# Patient Record
Sex: FEMALE | Race: WHITE | NOT HISPANIC OR LATINO | URBAN - METROPOLITAN AREA
[De-identification: names, ages, dates, MRNs, and addresses within clinical notes are randomized per-mention and may not be internally consistent; named-entity substitution may affect disease eponyms.]

---

## 2019-02-19 ENCOUNTER — EMERGENCY (EMERGENCY)
Facility: HOSPITAL | Age: 59
LOS: 0 days | Discharge: HOME | End: 2019-02-19
Attending: EMERGENCY MEDICINE | Admitting: EMERGENCY MEDICINE

## 2019-02-19 VITALS
SYSTOLIC BLOOD PRESSURE: 163 MMHG | RESPIRATION RATE: 18 BRPM | HEART RATE: 98 BPM | TEMPERATURE: 98 F | DIASTOLIC BLOOD PRESSURE: 95 MMHG | OXYGEN SATURATION: 98 %

## 2019-02-19 VITALS
WEIGHT: 134.92 LBS | HEART RATE: 124 BPM | RESPIRATION RATE: 20 BRPM | DIASTOLIC BLOOD PRESSURE: 102 MMHG | SYSTOLIC BLOOD PRESSURE: 187 MMHG | TEMPERATURE: 97 F | OXYGEN SATURATION: 98 %

## 2019-02-19 DIAGNOSIS — L76.21 POSTPROCEDURAL HEMORRHAGE OF SKIN AND SUBCUTANEOUS TISSUE FOLLOWING A DERMATOLOGIC PROCEDURE: ICD-10-CM

## 2019-02-19 DIAGNOSIS — I10 ESSENTIAL (PRIMARY) HYPERTENSION: ICD-10-CM
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
## 2019-02-19 DIAGNOSIS — Z79.899 OTHER LONG TERM (CURRENT) DRUG THERAPY: ICD-10-CM
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
## 2019-02-19 DIAGNOSIS — F41.9 ANXIETY DISORDER, UNSPECIFIED: ICD-10-CM

## 2019-02-19 RX ORDER — ALPRAZOLAM 0.25 MG
0 TABLET ORAL
Qty: 0 | Refills: 0 | COMMUNITY

## 2019-02-19 RX ORDER — LISINOPRIL 2.5 MG/1
0 TABLET ORAL
Qty: 0 | Refills: 0 | COMMUNITY

## 2019-02-19 RX ORDER — HYDROCHLOROTHIAZIDE 25 MG
0 TABLET ORAL
Qty: 0 | Refills: 0 | COMMUNITY

## 2019-02-19 RX ORDER — METOPROLOL TARTRATE 50 MG
0 TABLET ORAL
Qty: 0 | Refills: 0 | COMMUNITY

## 2019-02-19 NOTE — ED ADULT NURSE NOTE - CHPI ED NUR SYMPTOMS NEG
no fever/no vomiting/no pain/no purulent drainage/no rectal pain/no redness/no drainage/no chills/no blood in mucus

## 2019-02-19 NOTE — ED PROVIDER NOTE - NS ED ROS FT
Review of Systems    Constitutional: (-) weakness (-) diaphoresis   Eyes: (-) change in vision (-) eye pain  Cardiovascular: (-) chest pain  (-) palpitations  Respiratory: (-) SOB (-) cough   GI: (-) abdominal pain (-) N/V   Integumentary: (-) rash (-) redness   Neurological:  (-) focal deficit (-) altered mental status

## 2019-02-19 NOTE — ED PROVIDER NOTE - PROGRESS NOTE DETAILS
pt resting comfortably no longer bleeding, wound well appearing. Pt is being visited by wound nurse in the AM.

## 2019-02-19 NOTE — ED PROVIDER NOTE - ATTENDING CONTRIBUTION TO CARE
59yo woman h/o HTN, anxiety had face lift 1 day PTA and presented after she noted bleeding though bandage after she vomited today. She had a similar experience after prior face lift and wanted to get checked out, though she does have a wound care nurse coming to the house in the morning. VS, exam as noted, initially hypertensive but also extremely anxious. Wound clean, no active bleeding, specialized dressing still in place. Will observe for bleeding recurrence, likely d/c.

## 2019-02-19 NOTE — ED PROVIDER NOTE - NSFOLLOWUPINSTRUCTIONS_ED_ALL_ED_FT
Wound Care    Taking care of your wound properly can help to prevent pain and infection. It can also help your wound to heal more quickly.     HOW TO CARE FOR YOUR WOUND   Take or apply over-the-counter and prescription medicines only as told by your health care provider.  If you were prescribed antibiotic medicine, take or apply it as told by your health care provider. Do not stop using the antibiotic even if your condition improves.  Clean the wound each day or as told by your health care provider.  Wash the wound with mild soap and water.  Rinse the wound with water to remove all soap.  Pat the wound dry with a clean towel. Do not rub it.  There are many different ways to close and cover a wound. For example, a wound can be covered with stitches (sutures), skin glue, or adhesive strips. Follow instructions from your health care provider about:  How to take care of your wound.  When and how you should change your bandage (dressing).  When you should remove your dressing.  Removing whatever was used to close your wound.  Check your wound every day for signs of infection. Watch for:  Redness, swelling, or pain.  Fluid, blood, or pus.  Keep the dressing dry until your health care provider says it can be removed. Do not take baths, swim, use a hot tub, or do anything that would put your wound underwater until your health care provider approves.  Raise (elevate) the injured area above the level of your heart while you are sitting or lying down.  Do not scratch or pick at the wound.  Keep all follow-up visits as told by your health care provider. This is important.    SEEK MEDICAL CARE IF:  You received a tetanus shot and you have swelling, severe pain, redness, or bleeding at the injection site.  You have a fever.  Your pain is not controlled with medicine.  You have increased redness, swelling, or pain at the site of your wound.  You have fluid, blood, or pus coming from your wound.  You notice a bad smell coming from your wound or your dressing.    SEEK IMMEDIATE MEDICAL CARE IF:  You have a red streak going away from your wound.    FOLLOW UP WITH DR JEAN-BAPTISTE

## 2019-02-19 NOTE — ED PROVIDER NOTE - OBJECTIVE STATEMENT
59 yo f s/p facial lift post op day 0 pw bleeding at the wound site that began earlier today after pt sneezed as per pt and dry heaved. Pt pw l sided facial bleeding. Pt reports the bleeding does not go past the dressing and pt has a wound care nurse seeing her tommorow. Denies CP, SOB, palpitations, loc.    I have reviewed available current nursing and previous documentation of past medical, surgical, family, and/or social history. 57 yo f s/p facial lift post op day 0 pw bleeding at the wound site that began earlier today after pt sneezed as per pt and dry heaved. Pt pw l sided facial bleeding. Pt reports the bleeding does not go past the dressing and pt has a wound care nurse seeing her tomorrow. No aggravating no alleviating symptoms. Denies CP, SOB, palpitations, loc.    I have reviewed available current nursing and previous documentation of past medical, surgical, family, and/or social history.

## 2019-02-19 NOTE — ED PROVIDER NOTE - CLINICAL SUMMARY MEDICAL DECISION MAKING FREE TEXT BOX
No bleeding while in the ED. Kurlex dressing was placed and pt will f/u with wound care nurse tomorrow morning as scheduled. BP improved (after pt took her own xanax). Pt will return to the ED for new or worsening sx.

## 2019-02-19 NOTE — ED PROVIDER NOTE - PHYSICAL EXAMINATION
Physical Exam    Vital Signs: I have reviewed the initial vital signs.  Constitutional: well-nourished, appears stated age, no acute distress  Eyes: PERRLA, and symmetrical lids.  ENT: Neck supple with no adenopathy, moist MM.  Cardiovascular: regular rate, regular rhythm, well-perfused extremities  Respiratory: unlabored respiratory effort, clear to auscultation bilaterally  Gastrointestinal: soft, non-tender abdomen, no pulsatile mass  Musculoskeletal: supple neck, no lower extremity edema  Integumentary: warm, dry, no rash, b/l wound sites well appearing no dehiscence and not actively bleeding.

## 2019-02-19 NOTE — ED ADULT NURSE NOTE - NSIMPLEMENTINTERV_GEN_ALL_ED
Implemented All Universal Safety Interventions:  [place] to call system. Call bell, personal items and telephone within reach. Instruct patient to call for assistance. Room bathroom lighting operational. Non-slip footwear when patient is off stretcher. Physically safe environment: no spills, clutter or unnecessary equipment. Stretcher in lowest position, wheels locked, appropriate side rails in place.

## 2019-02-19 NOTE — ED ADULT TRIAGE NOTE - CHIEF COMPLAINT QUOTE
pt sts she had a facelift done earlier today; tonight she sts the left side of her face wont stop bleeding and she cannot feel the right side of her face; also c/o bilat ear pain

## 2019-03-21 ENCOUNTER — EMERGENCY (EMERGENCY)
Facility: HOSPITAL | Age: 59
LOS: 0 days | Discharge: HOME | End: 2019-03-21
Admitting: PHYSICIAN ASSISTANT

## 2019-03-21 VITALS
RESPIRATION RATE: 16 BRPM | OXYGEN SATURATION: 100 % | HEART RATE: 90 BPM | SYSTOLIC BLOOD PRESSURE: 170 MMHG | TEMPERATURE: 98 F | DIASTOLIC BLOOD PRESSURE: 104 MMHG

## 2019-03-21 VITALS — WEIGHT: 130.07 LBS | HEIGHT: 64 IN

## 2019-03-21 DIAGNOSIS — I10 ESSENTIAL (PRIMARY) HYPERTENSION: ICD-10-CM

## 2019-03-21 DIAGNOSIS — M54.5 LOW BACK PAIN: ICD-10-CM

## 2019-03-21 DIAGNOSIS — Z79.899 OTHER LONG TERM (CURRENT) DRUG THERAPY: ICD-10-CM

## 2019-03-21 DIAGNOSIS — B02.9 ZOSTER WITHOUT COMPLICATIONS: ICD-10-CM

## 2019-03-21 DIAGNOSIS — Z79.52 LONG TERM (CURRENT) USE OF SYSTEMIC STEROIDS: ICD-10-CM

## 2019-03-21 PROBLEM — F41.9 ANXIETY DISORDER, UNSPECIFIED: Chronic | Status: ACTIVE | Noted: 2019-02-19

## 2019-03-21 RX ORDER — VALACYCLOVIR 500 MG/1
1 TABLET, FILM COATED ORAL
Qty: 21 | Refills: 0 | OUTPATIENT
Start: 2019-03-21 | End: 2019-03-27

## 2019-03-21 NOTE — ED PROVIDER NOTE - OBJECTIVE STATEMENT
57 y/o F, PMHx HTN, presents to the ED with complaints of right lower back pain x three days. Patient does not recall any inciting injury/trauma and admits to a burning-like sensation without associated fever, chills, abdominal pain, nausea, vomiting, chest pain & dyspnea. She took Motrin 600mg yesterday x two doses with mild symptomatic improvement.

## 2019-03-21 NOTE — ED PROVIDER NOTE - NSFOLLOWUPINSTRUCTIONS_ED_ALL_ED_FT
Follow up with your primary care physician this week.   See information sheet(s) for further discharge instructions.

## 2019-11-06 ENCOUNTER — EMERGENCY (EMERGENCY)
Facility: HOSPITAL | Age: 59
LOS: 0 days | Discharge: HOME | End: 2019-11-06
Admitting: EMERGENCY MEDICINE
Payer: COMMERCIAL

## 2019-11-06 VITALS
SYSTOLIC BLOOD PRESSURE: 146 MMHG | TEMPERATURE: 97 F | OXYGEN SATURATION: 100 % | HEART RATE: 92 BPM | WEIGHT: 139.99 LBS | DIASTOLIC BLOOD PRESSURE: 87 MMHG | RESPIRATION RATE: 18 BRPM

## 2019-11-06 DIAGNOSIS — R04.0 EPISTAXIS: ICD-10-CM

## 2019-11-06 PROCEDURE — 99283 EMERGENCY DEPT VISIT LOW MDM: CPT | Mod: 25

## 2019-11-06 PROCEDURE — 30901 CONTROL OF NOSEBLEED: CPT

## 2019-11-06 NOTE — ED ADULT TRIAGE NOTE - CHIEF COMPLAINT QUOTE
nose bleed started 2 hours ago .No active bleeding in triage. pt reports had two episodes of nose bleed this week

## 2019-11-06 NOTE — ED PROVIDER NOTE - PHYSICAL EXAMINATION
CONSTITUTIONAL: Well-appearing; well-nourished; in no apparent distress.   ENT: epistaxis right anterior nare, controlled but slow ooze; otherwise normal nose; normal pharynx with no tonsillar hypertrophy.   SKIN: Normal for age and race; warm; dry; good turgor; no apparent lesions or exudate.   NEURO/PSYCH: A & O x 4; grossly unremarkable. mood and manner are appropriate. Grooming and personal hygiene are appropriate. No apparent thoughts of harm to self or others.

## 2019-11-06 NOTE — ED PROVIDER NOTE - OBJECTIVE STATEMENT
pt c/o epistaxis, several episodes today; similar issue a few months ago requiring cauterization in ED; pt now requesting this done again  f/u with ENT at that time and was told no further eval necessary  denies trauma; Denies fever/chill/HA/dizziness/chest pain/palpitation/sob/abd pain/n/v/d/ black stool/bloody stool/urinary sxs

## 2019-11-06 NOTE — ED PROCEDURE NOTE - CPROC ED NASAL DETAIL1
The bleeding stopped./The area was cauterized with silver nitrate./The source of the bleeding was clearly identified.

## 2019-11-06 NOTE — ED PROVIDER NOTE - PATIENT PORTAL LINK FT
You can access the FollowMyHealth Patient Portal offered by [facility] by registering at the following website: [email or web address]. By joining [email or web address]’s FollowMyHealth portal, you will also be able to view your health information using other applications (apps) compatible with our system.

## 2019-11-06 NOTE — ED PROVIDER NOTE - NSFOLLOWUPCLINICS_GEN_ALL_ED_FT
[facility]  ENT  [address], [address]nd [place] [address]  Phone: [phone]  Fax:   Follow Up Time:

## 2019-11-06 NOTE — ED PROVIDER NOTE - NS ED ROS FT
Constitutional: no fever, chills, no recent weight loss, change in appetite or malaise  Eyes: no redness/discharge/pain/vision changes  ENT: no ear pain/sore throat  Cardiac: No chest pain, SOB or edema.  Respiratory: No cough or respiratory distress  GI: No nausea, vomiting, diarrhea or abdominal pain.  : No dysuria, frequency, urgency or hematuria  MS: no pain to back or extremities, no loss of ROM, no weakness  Neuro: No headache or weakness. No LOC.  Skin: No skin rash.  Endocrine: No history of thyroid disease or diabetes.  Except as documented in the HPI, all other systems are negative.

## 2019-11-06 NOTE — ED ADULT NURSE NOTE - OBJECTIVE STATEMENT
pt aox4 complaining of nose bleed; pt in no respiratory distress. seen by PA; ok for d/c after bleeding stopped

## 2020-11-03 ENCOUNTER — TRANSCRIPTION ENCOUNTER (OUTPATIENT)
Age: 60
End: 2020-11-03

## 2022-01-29 ENCOUNTER — TRANSCRIPTION ENCOUNTER (OUTPATIENT)
Age: 62
End: 2022-01-29

## 2022-02-14 ENCOUNTER — TRANSCRIPTION ENCOUNTER (OUTPATIENT)
Age: 62
End: 2022-02-14

## 2022-05-24 PROBLEM — Z00.00 ENCOUNTER FOR PREVENTIVE HEALTH EXAMINATION: Status: ACTIVE | Noted: 2022-05-24

## 2023-01-18 ENCOUNTER — OUTPATIENT (OUTPATIENT)
Dept: OUTPATIENT SERVICES | Facility: HOSPITAL | Age: 63
LOS: 1 days | Discharge: HOME | End: 2023-01-18
Payer: COMMERCIAL

## 2023-01-18 DIAGNOSIS — Z85.3 PERSONAL HISTORY OF MALIGNANT NEOPLASM OF BREAST: ICD-10-CM

## 2023-01-18 DIAGNOSIS — N64.4 MASTODYNIA: ICD-10-CM

## 2023-01-18 PROCEDURE — 76641 ULTRASOUND BREAST COMPLETE: CPT | Mod: 26,RT

## 2023-04-10 NOTE — ED PROVIDER NOTE - PRO INTERPRETER NEED 2
Dear Valerie Tolentino    After reviewing your responses, I've been able to diagnose you with a urinary tract infection, which is a common infection of the bladder with bacteria.  This is not a sexually transmitted infection, though urinating immediately after intercourse can help prevent infections.  Drinking lots of fluids is also helpful to clear your current infection and prevent the next one.      I have sent a prescription for antibiotics to your pharmacy to treat this infection.    It is important that you take all of your prescribed medication even if your symptoms are improving after a few doses.  Taking all of your medicine helps prevent the symptoms from returning.     If your symptoms worsen, you develop pain in your back or stomach, develop fevers, or are not improving in 5 days, please contact your primary care provider for an appointment or visit any of our convenient Walk-in or Urgent Care Centers to be seen, which can be found on our website here.    Thanks again for choosing us as your health care partner,    Adriana Palomino PA-C  Dear Valerie Tolentino,     After reviewing your responses, I would like you to come in for a urine test to make sure we treat you correctly. This urine test is to evaluate you for a possible urinary tract infection, and should be scheduled for today or tomorrow. Schedule a Lab Only appointment here.     Lab appointments are not available at most locations on the weekends. If no Lab Only appointment is available, you should be seen in any of our convenient Walk-in or Urgent Care Centers, which can be found on our website here.     You will receive instructions with your results in [employer] once they are available.     If your symptoms worsen, you develop pain in your back or stomach, develop fevers, or are not improving in 5 days, please contact your primary care provider for an appointment or visit a Walk-in or Urgent Care Center to be seen.     Thanks again for choosing us as your  health care partner,     Adriana Palomino PA-C    Urinary Tract Infections in Women  Urinary tract infections (UTIs) are most often caused by bacteria. These bacteria enter the urinary tract. The bacteria may come from inside the body. Or they may travel from the skin outside the rectum or vagina into the urethra. Female anatomy makes it easy for bacteria from the bowel to enter a woman s urinary tract, which is the most common source of UTI. This means women develop UTIs more often than men. Pain in or around the urinary tract is a common UTI symptom. But the only way to know for sure if you have a UTI for the healthcare provider to test your urine. The two tests that may be done are the urinalysis and urine culture.    Types of UTIs    Cystitis. A bladder infection (cystitis) is the most common UTI in women. You may have urgent or frequent need to pee. You may also have pain, burning when you pee, and bloody urine.    Urethritis. This is an inflamed urethra, which is the tube that carries urine from the bladder to outside the body. You may have lower stomach or back pain. You may also have urgent or frequent need to pee.    Pyelonephritis. This is a kidney infection. If not treated, it can be serious and damage your kidneys. In severe cases, you may need to stay in the hospital. You may have a fever and lower back pain.    Medicines to treat a UTI  Most UTIs are treated with antibiotics. These kill the bacteria. The length of time you need to take them depends on the type of infection. It may be as short as 3 days. If you have repeated UTIs, you may need a low-dose antibiotic for several months. Take antibiotics exactly as directed. Don t stop taking them until all of the medicine is gone, even if you feel better. If you stop taking the antibiotic too soon, the infection may not go away. You may also develop a resistance to the antibiotic. This can make it much harder to treat.  Lifestyle changes to treat and prevent  UTIs  The lifestyle changes below will help get rid of your UTI. They may also help prevent future UTIs.    Drink plenty of fluids. This includes water, juice, or other caffeine-free drinks. Fluids help flush bacteria out of your body.    Empty your bladder. Always empty your bladder when you feel the urge to pee. And always pee before going to sleep. Urine that stays in your bladder can lead to infection. Try to pee before and after sex as well.    Practice good personal hygiene. Wipe yourself from front to back after using the toilet. This helps keep bacteria from getting into the urethra.    Wear cotton underwear. Don't wear synthetic or tight-fitting underwear that can trap moisture. Change out of wet bathing suits and workout clothing quickly.    Take showers. Showers are better than baths for preventing UTIs.    Use condoms during sex. These help prevent UTIs caused by sexually transmitted bacteria. Also don't use spermicides during sex. These can increase the risk for UTIs. Choose other forms of birth control instead. For women who tend to get UTIs after sex, a low-dose of a preventive antibiotic may be used. Be sure to discuss this option with your healthcare provider.    Follow up with your healthcare provider as directed. They may test to make sure the infection has cleared. If needed, more treatment may be started.  Romie last reviewed this educational content on 9/1/2021 2000-2022 [employer]. All rights reserved. This information is not intended as a substitute for professional medical care. Always follow your healthcare professional's instructions.         English

## 2023-07-26 ENCOUNTER — EMERGENCY (EMERGENCY)
Facility: HOSPITAL | Age: 63
LOS: 0 days | Discharge: HOME | End: 2023-07-26
Attending: EMERGENCY MEDICINE
Payer: COMMERCIAL

## 2023-07-26 VITALS
RESPIRATION RATE: 16 BRPM | HEART RATE: 92 BPM | WEIGHT: 139.99 LBS | OXYGEN SATURATION: 97 % | TEMPERATURE: 98 F | HEIGHT: 64 IN | SYSTOLIC BLOOD PRESSURE: 170 MMHG | DIASTOLIC BLOOD PRESSURE: 102 MMHG

## 2023-07-26 DIAGNOSIS — I10 ESSENTIAL (PRIMARY) HYPERTENSION: ICD-10-CM

## 2023-07-26 DIAGNOSIS — Z00.00 ENCOUNTER FOR GENERAL ADULT MEDICAL EXAMINATION WITHOUT ABNORMAL FINDINGS: ICD-10-CM

## 2023-07-26 DIAGNOSIS — F41.9 ANXIETY DISORDER, UNSPECIFIED: ICD-10-CM

## 2023-07-26 PROCEDURE — 82962 GLUCOSE BLOOD TEST: CPT

## 2023-07-26 PROCEDURE — 99282 EMERGENCY DEPT VISIT SF MDM: CPT

## 2023-07-26 PROCEDURE — 99283 EMERGENCY DEPT VISIT LOW MDM: CPT

## 2023-07-26 NOTE — ED PROVIDER NOTE - PHYSICAL EXAMINATION
CONSTITUTIONAL: in no apparent distress.   ENT: Hearing is intact with good acuity to spoken voice.  Patient is speaking clearly, not muffled and airway is intact.   RESPIRATORY: No signs of respiratory distress. Lung sounds are clear in all lobes bilaterally without rales, rhonchi, or wheezes.  CARDIOVASCULAR: Regular rate and rhythm.   GI: Abdomen is soft, non-tender, and without distention. Bowel sounds are present and normoactive in all four quadrants. No masses are noted.   MS: Normal appearance and ROM in all four extremities. No tenderness to palpation and distal pulses are normal. Sensation to the upper and lower extremities is normal bilaterally. Steady gait noted.  NEURO: A & O x 3. Normal speech. No focal deficit.  PSYCHOLOGICAL: Appropriate mood and affect. Good judgement and insight.

## 2023-07-26 NOTE — ED PROVIDER NOTE - CLINICAL SUMMARY MEDICAL DECISION MAKING FREE TEXT BOX
63 y.o. female, PMH of anxiety, HTN, presents to the ED with joint pain. Reports that she has been having pain in her joints for the past 4 days and she is really here today for blood work.  Reports that she had preop blood work done 2 days ago and thinks that her beta globulin level was abnormal, so she wants to get tested again in the ER.  Patient's lab work was reviewed with the patient and shows that her beta globulin level is actually within the normal range.  Denies fever, shortness of breath, chest pain, nausea, vomit, abdominal pain, urinary symptoms, and change with bowel movement. ON exam, pt in NAD, AAOx3, head NC/AT, CN II-XII intact, PEERL, EOMi, neck (-) midline tenderness, lungs CTA B/L, CV S1S2 regular, abdomen soft/NT/ND/(+)BS, ext (-) edema, motor 5/5x4, sensation intact, ambulating with steady gait. Pt reassured. Will d/c.

## 2023-07-26 NOTE — ED PROVIDER NOTE - PROGRESS NOTE DETAILS
Patient is stable for discharge and will have patient follow-up with her primary care doctor outpatient.  Patient just had blood work done 2 days ago and results were unremarkable.

## 2023-07-26 NOTE — ED ADULT NURSE NOTE - NSFALLUNIVINTERV_ED_ALL_ED
Bed/Stretcher in lowest position, wheels locked, appropriate side rails in place/Call bell, personal items and telephone in reach/Instruct patient to call for assistance before getting out of bed/chair/stretcher/Non-slip footwear applied when patient is off stretcher/[place] to call system/Physically safe environment - no spills, clutter or unnecessary equipment/Purposeful proactive rounding/Room/bathroom lighting operational, light cord in reach

## 2023-07-26 NOTE — ED PROVIDER NOTE - PATIENT PORTAL LINK FT
You can access the FollowMyHealth Patient Portal offered by [facility] by registering at the following website: [email or web address]. By joining [employer]’s FollowMyHealth portal, you will also be able to view your health information using other applications (apps) compatible with our system.

## 2023-07-26 NOTE — ED ADULT TRIAGE NOTE - PRO INTERPRETER NEED 2
"Requested Prescriptions   Pending Prescriptions Disp Refills     ISIBLOOM 0.15-30 MG-MCG per tablet [Pharmacy Med Name: ISIBLOOM 0.15MG-0.03MG TABLETS 28S] 28 tablet 0     Sig: TAKE 1 TABLET BY MOUTH EVERY DAY.    Contraceptives Protocol Failed    9/5/2018 10:47 AM       Failed - Recent (12 mo) or future (30 days) visit within the authorizing provider's specialty    Patient had office visit in the last 12 months or has a visit in the next 30 days with authorizing provider or within the authorizing provider's specialty.  See \"Patient Info\" tab in inbasket, or \"Choose Columns\" in Meds & Orders section of the refill encounter.           Passed - Patient is not a current smoker if age is 35 or older       Passed - No active pregnancy on record       Passed - No positive pregnancy test in past 12 months        Routing refill request to provider for review/approval because:  Jerilyn given x1 and patient did not follow up, please advise  Pt was given a month refill on 8/7/18 and told to be seen in clinic for further refills.    Nely Mracelo RN          "
Looking at 8/30/18 encounter-patient not able to come in to be seen until home in December. OB team has been attempting to contact patient/mother to get pharmacy of preference. If we can get that information, I will do 3 month extension until she is home. Should plan visit in December. Thank you. Suzi PRECIADO CNP    
Will deny this medication request since there is another request for the same medication.    Nely Marcelo RN    
English

## 2023-08-16 ENCOUNTER — APPOINTMENT (OUTPATIENT)
Dept: HEMATOLOGY ONCOLOGY | Facility: CLINIC | Age: 63
End: 2023-08-16
Payer: COMMERCIAL

## 2023-08-16 ENCOUNTER — OUTPATIENT (OUTPATIENT)
Dept: OUTPATIENT SERVICES | Facility: HOSPITAL | Age: 63
LOS: 1 days | End: 2023-08-16
Payer: COMMERCIAL

## 2023-08-16 ENCOUNTER — LABORATORY RESULT (OUTPATIENT)
Age: 63
End: 2023-08-16

## 2023-08-16 VITALS
DIASTOLIC BLOOD PRESSURE: 90 MMHG | HEIGHT: 64 IN | SYSTOLIC BLOOD PRESSURE: 165 MMHG | BODY MASS INDEX: 23.9 KG/M2 | TEMPERATURE: 97.6 F | HEART RATE: 82 BPM | WEIGHT: 140 LBS

## 2023-08-16 DIAGNOSIS — Z78.9 OTHER SPECIFIED HEALTH STATUS: ICD-10-CM

## 2023-08-16 DIAGNOSIS — I10 ESSENTIAL (PRIMARY) HYPERTENSION: ICD-10-CM

## 2023-08-16 DIAGNOSIS — Z80.7 FAMILY HISTORY OF OTHER MALIGNANT NEOPLASMS OF LYMPHOID, HEMATOPOIETIC AND RELATED TISSUES: ICD-10-CM

## 2023-08-16 DIAGNOSIS — D47.2 MONOCLONAL GAMMOPATHY: ICD-10-CM

## 2023-08-16 DIAGNOSIS — Z86.000 PERSONAL HISTORY OF IN-SITU NEOPLASM OF BREAST: ICD-10-CM

## 2023-08-16 DIAGNOSIS — Z80.6 FAMILY HISTORY OF LEUKEMIA: ICD-10-CM

## 2023-08-16 LAB
HCT VFR BLD CALC: 41.7 %
HGB BLD-MCNC: 13.8 G/DL
MCHC RBC-ENTMCNC: 30.1 PG
MCHC RBC-ENTMCNC: 33.1 G/DL
MCV RBC AUTO: 90.8 FL
PLATELET # BLD AUTO: 294 K/UL
PMV BLD: 9.7 FL
RBC # BLD: 4.59 M/UL
RBC # FLD: 13 %
WBC # FLD AUTO: 5.47 K/UL

## 2023-08-16 PROCEDURE — 99204 OFFICE O/P NEW MOD 45 MIN: CPT

## 2023-08-16 PROCEDURE — 83615 LACTATE (LD) (LDH) ENZYME: CPT

## 2023-08-16 PROCEDURE — 85027 COMPLETE CBC AUTOMATED: CPT

## 2023-08-16 PROCEDURE — 80053 COMPREHEN METABOLIC PANEL: CPT

## 2023-08-16 PROCEDURE — 84165 PROTEIN E-PHORESIS SERUM: CPT

## 2023-08-16 PROCEDURE — 82784 ASSAY IGA/IGD/IGG/IGM EACH: CPT

## 2023-08-16 PROCEDURE — 84155 ASSAY OF PROTEIN SERUM: CPT

## 2023-08-16 PROCEDURE — 83521 IG LIGHT CHAINS FREE EACH: CPT

## 2023-08-16 PROCEDURE — 86334 IMMUNOFIX E-PHORESIS SERUM: CPT

## 2023-08-16 RX ORDER — HYDROCHLOROTHIAZIDE 50 MG/1
50 TABLET ORAL
Refills: 0 | Status: ACTIVE | COMMUNITY

## 2023-08-16 RX ORDER — METOPROLOL TARTRATE 75 MG/1
TABLET, FILM COATED ORAL
Refills: 0 | Status: ACTIVE | COMMUNITY

## 2023-08-16 RX ORDER — LISINOPRIL 30 MG/1
TABLET ORAL
Refills: 0 | Status: ACTIVE | COMMUNITY

## 2023-08-16 RX ORDER — LISINOPRIL 40 MG/1
40 TABLET ORAL
Refills: 0 | Status: ACTIVE | COMMUNITY

## 2023-08-16 RX ORDER — HYDROCHLOROTHIAZIDE 12.5 MG/1
CAPSULE ORAL
Refills: 0 | Status: ACTIVE | COMMUNITY

## 2023-08-16 NOTE — PHYSICAL EXAM
[Fully active, able to carry on all pre-disease performance without restriction] : Status 0 - Fully active, able to carry on all pre-disease performance without restriction [Normal] : affect appropriate [de-identified] : RT eye ptosis [de-identified] : s/p mastectomy and implant reconstruction

## 2023-08-16 NOTE — ASSESSMENT
[FreeTextEntry1] : IMPRESSION  IgM lambda MGUS  PLAN discussed natural history and prognosis of MGUS. Will send w/u as noted. Pt reassured. Pt advised to bring prior pathology reports for review.  # DCIS RT BREAST  S/p B/L mastectomy Continue observation   RTC in 2 M

## 2023-08-16 NOTE — HISTORY OF PRESENT ILLNESS
[de-identified] : This is 63 yr old woman  who had developed RT eyelid droop s/p plastic surgery, told that she has scar tissue.  W/U was sent and an M protein IgM lambda 0.3g/dl  No bon epain, WENDY or wt. No fever, night sweats.  Apparently initial pathology on eyelid surgery was ? lymphoma. Saw Dr Anthony told she did not have lymphoma  H/O DCIS s/p B/L mastectomy and implant reconstruction

## 2023-08-16 NOTE — REVIEW OF SYSTEMS
[Diarrhea: Grade 0] : Diarrhea: Grade 0 [Negative] : Allergic/Immunologic [FreeTextEntry2] : last colonoscopy 2 yrs ago, UTD with GYN

## 2023-08-17 DIAGNOSIS — D47.2 MONOCLONAL GAMMOPATHY: ICD-10-CM

## 2023-08-21 LAB
ALBUMIN MFR SERPL ELPH: 56 %
ALBUMIN SERPL ELPH-MCNC: 4.3 G/DL
ALBUMIN SERPL-MCNC: 3.9 G/DL
ALBUMIN/GLOB SERPL: 1.3 RATIO
ALP BLD-CCNC: 94 U/L
ALPHA1 GLOB MFR SERPL ELPH: 4 %
ALPHA1 GLOB SERPL ELPH-MCNC: 0.3 G/DL
ALPHA2 GLOB MFR SERPL ELPH: 9.3 %
ALPHA2 GLOB SERPL ELPH-MCNC: 0.6 G/DL
ALT SERPL-CCNC: 12 U/L
ANION GAP SERPL CALC-SCNC: 12 MMOL/L
AST SERPL-CCNC: 17 U/L
B-GLOBULIN MFR SERPL ELPH: 12.3 %
B-GLOBULIN SERPL ELPH-MCNC: 0.8 G/DL
BILIRUB SERPL-MCNC: 0.6 MG/DL
BUN SERPL-MCNC: 7 MG/DL
CALCIUM SERPL-MCNC: 9.6 MG/DL
CHLORIDE SERPL-SCNC: 104 MMOL/L
CO2 SERPL-SCNC: 27 MMOL/L
CREAT SERPL-MCNC: 0.8 MG/DL
DEPRECATED KAPPA LC FREE/LAMBDA SER: 0.79 RATIO
EGFR: 82 ML/MIN/1.73M2
GAMMA GLOB FLD ELPH-MCNC: 1.3 G/DL
GAMMA GLOB MFR SERPL ELPH: 18.4 %
GLUCOSE SERPL-MCNC: 101 MG/DL
IGA SER QL IEP: 246 MG/DL
IGG SER QL IEP: 1071 MG/DL
IGM SER QL IEP: 730 MG/DL
INTERPRETATION SERPL IEP-IMP: NORMAL
KAPPA LC CSF-MCNC: 4.35 MG/DL
KAPPA LC SERPL-MCNC: 3.44 MG/DL
LDH SERPL-CCNC: 166
M PROTEIN MFR SERPL ELPH: 4.1 %
M PROTEIN SPEC IFE-MCNC: NORMAL
MONOCLON BAND OBS SERPL: 0.3 G/DL
POTASSIUM SERPL-SCNC: 4.3 MMOL/L
PROT SERPL-MCNC: 6.9 G/DL
PROT SERPL-MCNC: 6.9 G/DL
PROT SERPL-MCNC: 7.1 G/DL
SODIUM SERPL-SCNC: 143 MMOL/L

## 2023-10-24 ENCOUNTER — OUTPATIENT (OUTPATIENT)
Dept: OUTPATIENT SERVICES | Facility: HOSPITAL | Age: 63
LOS: 1 days | End: 2023-10-24
Payer: COMMERCIAL

## 2023-10-24 DIAGNOSIS — R91.8 OTHER NONSPECIFIC ABNORMAL FINDING OF LUNG FIELD: ICD-10-CM

## 2023-10-24 PROCEDURE — 71046 X-RAY EXAM CHEST 2 VIEWS: CPT

## 2023-10-24 PROCEDURE — 71046 X-RAY EXAM CHEST 2 VIEWS: CPT | Mod: 26

## 2023-10-25 DIAGNOSIS — R91.8 OTHER NONSPECIFIC ABNORMAL FINDING OF LUNG FIELD: ICD-10-CM

## 2023-10-30 ENCOUNTER — OUTPATIENT (OUTPATIENT)
Dept: OUTPATIENT SERVICES | Facility: HOSPITAL | Age: 63
LOS: 1 days | End: 2023-10-30
Payer: COMMERCIAL

## 2023-10-30 ENCOUNTER — APPOINTMENT (OUTPATIENT)
Dept: HEMATOLOGY ONCOLOGY | Facility: CLINIC | Age: 63
End: 2023-10-30
Payer: COMMERCIAL

## 2023-10-30 VITALS
SYSTOLIC BLOOD PRESSURE: 108 MMHG | HEIGHT: 64 IN | HEART RATE: 108 BPM | TEMPERATURE: 97.9 F | OXYGEN SATURATION: 98 % | WEIGHT: 140 LBS | RESPIRATION RATE: 16 BRPM | BODY MASS INDEX: 23.9 KG/M2 | DIASTOLIC BLOOD PRESSURE: 86 MMHG

## 2023-10-30 DIAGNOSIS — D47.2 MONOCLONAL GAMMOPATHY: ICD-10-CM

## 2023-10-30 PROCEDURE — 99213 OFFICE O/P EST LOW 20 MIN: CPT

## 2023-12-14 ENCOUNTER — LABORATORY RESULT (OUTPATIENT)
Age: 63
End: 2023-12-14

## 2023-12-14 ENCOUNTER — OUTPATIENT (OUTPATIENT)
Dept: OUTPATIENT SERVICES | Facility: HOSPITAL | Age: 63
LOS: 1 days | End: 2023-12-14
Payer: COMMERCIAL

## 2023-12-14 ENCOUNTER — APPOINTMENT (OUTPATIENT)
Dept: HEMATOLOGY ONCOLOGY | Facility: CLINIC | Age: 63
End: 2023-12-14
Payer: COMMERCIAL

## 2023-12-14 VITALS
HEIGHT: 64 IN | TEMPERATURE: 97.9 F | BODY MASS INDEX: 23.9 KG/M2 | DIASTOLIC BLOOD PRESSURE: 94 MMHG | SYSTOLIC BLOOD PRESSURE: 165 MMHG | HEART RATE: 62 BPM | RESPIRATION RATE: 16 BRPM | WEIGHT: 140 LBS

## 2023-12-14 DIAGNOSIS — D05.11 INTRADUCTAL CARCINOMA IN SITU OF RIGHT BREAST: ICD-10-CM

## 2023-12-14 DIAGNOSIS — D47.2 MONOCLONAL GAMMOPATHY: ICD-10-CM

## 2023-12-14 DIAGNOSIS — C88.4 EXTRANODAL MARGINAL ZONE B-CELL LYMPHOMA OF MUCOSA-ASSOCIATED LYMPHOID TISSUE [MALT-LYMPHOMA]: ICD-10-CM

## 2023-12-14 PROCEDURE — 99215 OFFICE O/P EST HI 40 MIN: CPT

## 2023-12-14 PROCEDURE — 82784 ASSAY IGA/IGD/IGG/IGM EACH: CPT

## 2023-12-14 PROCEDURE — 88321 CONSLTJ&REPRT SLD PREP ELSWR: CPT

## 2023-12-14 PROCEDURE — 36415 COLL VENOUS BLD VENIPUNCTURE: CPT

## 2023-12-14 PROCEDURE — 80053 COMPREHEN METABOLIC PANEL: CPT

## 2023-12-14 PROCEDURE — 86334 IMMUNOFIX E-PHORESIS SERUM: CPT

## 2023-12-14 PROCEDURE — 85027 COMPLETE CBC AUTOMATED: CPT

## 2023-12-14 PROCEDURE — 84155 ASSAY OF PROTEIN SERUM: CPT

## 2023-12-14 PROCEDURE — 84165 PROTEIN E-PHORESIS SERUM: CPT

## 2023-12-14 PROCEDURE — 83521 IG LIGHT CHAINS FREE EACH: CPT

## 2023-12-15 DIAGNOSIS — D47.2 MONOCLONAL GAMMOPATHY: ICD-10-CM

## 2023-12-15 LAB
ALBUMIN SERPL ELPH-MCNC: 4.6 G/DL
ALP BLD-CCNC: 98 U/L
ALT SERPL-CCNC: 12 U/L
ANION GAP SERPL CALC-SCNC: 14 MMOL/L
AST SERPL-CCNC: 19 U/L
BILIRUB SERPL-MCNC: 0.6 MG/DL
BUN SERPL-MCNC: 7 MG/DL
CALCIUM SERPL-MCNC: 10.2 MG/DL
CHLORIDE SERPL-SCNC: 106 MMOL/L
CO2 SERPL-SCNC: 24 MMOL/L
CREAT SERPL-MCNC: 0.7 MG/DL
DEPRECATED KAPPA LC FREE/LAMBDA SER: 0.95 RATIO
EGFR: 97 ML/MIN/1.73M2
GLUCOSE SERPL-MCNC: 89 MG/DL
HCT VFR BLD CALC: 40.2 %
HGB BLD-MCNC: 14.3 G/DL
IGA SER QL IEP: 241 MG/DL
IGG SER QL IEP: 1122 MG/DL
IGM SER QL IEP: 766 MG/DL
KAPPA LC CSF-MCNC: 3.55 MG/DL
KAPPA LC SERPL-MCNC: 3.37 MG/DL
MCHC RBC-ENTMCNC: 31.3 PG
MCHC RBC-ENTMCNC: 35.6 G/DL
MCV RBC AUTO: 88 FL
PLATELET # BLD AUTO: 327 K/UL
PMV BLD: 9.8 FL
POTASSIUM SERPL-SCNC: 4 MMOL/L
PROT SERPL-MCNC: 7.5 G/DL
RBC # BLD: 4.57 M/UL
RBC # FLD: 12.8 %
SODIUM SERPL-SCNC: 144 MMOL/L
WBC # FLD AUTO: 5.72 K/UL

## 2023-12-15 NOTE — PHYSICAL EXAM
[Fully active, able to carry on all pre-disease performance without restriction] : Status 0 - Fully active, able to carry on all pre-disease performance without restriction [Normal] : affect appropriate [de-identified] : s/p right eye surgery, mild bruising in the eyelid. [de-identified] : s/p mastectomy and implant reconstruction. Chest/breast exam is unrearkable.

## 2023-12-15 NOTE — HISTORY OF PRESENT ILLNESS
[de-identified] : This is 63 yr old woman  who had developed RT eyelid droop s/p plastic surgery, told that she has scar tissue.  W/U was sent and an M protein IgM lambda 0.3g/dl  No bon epain, WENDY or wt. No fever, night sweats.  Apparently initial pathology on eyelid surgery was ? lymphoma. Saw Dr Anthony told she did not have lymphoma  H/O DCIS s/p B/L mastectomy and implant reconstruction [de-identified] : 10/30/23 Patient is here to follow up for MGUS. She feels well, denies any fever, chills, night sweats, bone pain or urinary symptoms. She is going for surgery of right eyelid on 11/16/23 @ [facility].  12/14/23 Patient is here to follow up for MGUS, DCIS and Non-Hodgkin Malt Lymphoma. She is s/p surgery on right eye and was advised that she has Non-Hodgkin Malt Lymphoma. She had seen another oncologist and was recommended to do PET scan and possible RT. She is recovering well, denies any breast/chest wall pain or mass, no fever, chills, bone pains or urinary symptoms. She feels well, denies any fever, chills, night sweats, bone pain or urinary symptoms.

## 2023-12-15 NOTE — ASSESSMENT
[FreeTextEntry1] : Patient is a 63 year old female with IgM lambda MGUS. She has a history of DCIS s/p bilateral mastectomy and implant reconstruction.   Work up on 8/16/23 was significant for IgM lambda band and M-spike 0.3, LDH, IgG, IgM, IgA and FLC were benign.  Records from [facility] on 11/16/23 reviewed. 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  PLAN: Previous notes reviewed and all relevant laboratory results discussed with Dr Robles and were communicated to the patient.  -- Will continue surveillance monitoring. -- Will do CBC, CMP, FLC, SPEP, IFES and IgG, IgA and IgM. CBC today is benign. -- Continue to follow up with PCP, gyne or GI for health maintenance and screenings.  # DCIS Right Breast -- S/p Bilateral mastectomy, chest exam -- Continue observation.  #Non-Hodgkin Malt Lymphoma -- Discussed in detail treatment options, pending PET scan results. -- Recommend Bone Marrow Biopsy, risks and benefits discussed, and she agreed. Procedure to be scheduled in 1/2024. Discussed treatment options including RT vs observation based on disease stage. Follow up 1/18/23 for bone marrow biopsy.  Case was seen and discussed with Dr. Robles who agreed with assessment and plan.

## 2023-12-19 LAB
ALBUMIN MFR SERPL ELPH: 58.2 %
ALBUMIN SERPL-MCNC: 4.4 G/DL
ALBUMIN/GLOB SERPL: 1.4 RATIO
ALPHA1 GLOB MFR SERPL ELPH: 3.9 %
ALPHA1 GLOB SERPL ELPH-MCNC: 0.3 G/DL
ALPHA2 GLOB MFR SERPL ELPH: 7.8 %
ALPHA2 GLOB SERPL ELPH-MCNC: 0.6 G/DL
B-GLOBULIN MFR SERPL ELPH: 11.8 %
B-GLOBULIN SERPL ELPH-MCNC: 0.9 G/DL
GAMMA GLOB FLD ELPH-MCNC: 1.4 G/DL
GAMMA GLOB MFR SERPL ELPH: 18.3 %
INTERPRETATION SERPL IEP-IMP: NORMAL
M PROTEIN MFR SERPL ELPH: 4.2 %
M PROTEIN SPEC IFE-MCNC: NORMAL
MONOCLON BAND OBS SERPL: 0.3 G/DL
PROT SERPL-MCNC: 7.5 G/DL

## 2023-12-22 ENCOUNTER — LABORATORY RESULT (OUTPATIENT)
Age: 63
End: 2023-12-22

## 2024-01-18 ENCOUNTER — APPOINTMENT (OUTPATIENT)
Dept: HEMATOLOGY ONCOLOGY | Facility: CLINIC | Age: 64
End: 2024-01-18

## 2024-04-23 ENCOUNTER — APPOINTMENT (OUTPATIENT)
Age: 64
End: 2024-04-23

## 2024-12-20 ENCOUNTER — NON-APPOINTMENT (OUTPATIENT)
Age: 64
End: 2024-12-20

## 2025-01-31 ENCOUNTER — EMERGENCY (EMERGENCY)
Facility: HOSPITAL | Age: 65
LOS: 0 days | Discharge: HOME | End: 2025-01-31
Attending: STUDENT IN AN ORGANIZED HEALTH CARE EDUCATION/TRAINING PROGRAM
Payer: MEDICARE

## 2025-01-31 ENCOUNTER — EMERGENCY (EMERGENCY)
Facility: HOSPITAL | Age: 65
LOS: 0 days | Discharge: HOME | End: 2025-01-31
Attending: EMERGENCY MEDICINE
Payer: MEDICARE

## 2025-01-31 VITALS
DIASTOLIC BLOOD PRESSURE: 91 MMHG | WEIGHT: 130.07 LBS | HEIGHT: 64 IN | OXYGEN SATURATION: 98 % | RESPIRATION RATE: 18 BRPM | SYSTOLIC BLOOD PRESSURE: 151 MMHG | TEMPERATURE: 99 F | HEART RATE: 76 BPM

## 2025-01-31 VITALS — SYSTOLIC BLOOD PRESSURE: 158 MMHG | DIASTOLIC BLOOD PRESSURE: 92 MMHG

## 2025-01-31 VITALS
OXYGEN SATURATION: 100 % | RESPIRATION RATE: 20 BRPM | TEMPERATURE: 98 F | WEIGHT: 130.07 LBS | SYSTOLIC BLOOD PRESSURE: 173 MMHG | DIASTOLIC BLOOD PRESSURE: 104 MMHG | HEIGHT: 64 IN | HEART RATE: 84 BPM

## 2025-01-31 VITALS — HEIGHT: 64 IN

## 2025-01-31 DIAGNOSIS — R04.0 EPISTAXIS: ICD-10-CM

## 2025-01-31 PROCEDURE — 99283 EMERGENCY DEPT VISIT LOW MDM: CPT

## 2025-01-31 PROCEDURE — 30903 CONTROL OF NOSEBLEED: CPT | Mod: LT

## 2025-01-31 PROCEDURE — 99284 EMERGENCY DEPT VISIT MOD MDM: CPT | Mod: FS,25

## 2025-01-31 PROCEDURE — 30901 CONTROL OF NOSEBLEED: CPT | Mod: LT,59

## 2025-01-31 PROCEDURE — 99283 EMERGENCY DEPT VISIT LOW MDM: CPT | Mod: FS,25

## 2025-01-31 RX ORDER — TRANEXAMIC ACID 650 MG/1
5 TABLET ORAL ONCE
Refills: 0 | Status: COMPLETED | OUTPATIENT
Start: 2025-01-31 | End: 2025-01-31

## 2025-01-31 RX ADMIN — TRANEXAMIC ACID 5 MILLILITER(S): 650 TABLET ORAL at 17:20

## 2025-01-31 NOTE — ED PROVIDER NOTE - NSFOLLOWUPINSTRUCTIONS_ED_ALL_ED_FT
Follow up with your pcp and ENT asa soon as possible. Our Emergency Department Referral Coordinators will be reaching out ot you in the next 24-48 hours from 9:00am to 5:00pm (Monday to Friday) with a follow up appointment. Please expect a phone call from the hospital in that time frame. If you do not receive a call or if you have any questions or concerns, you can reach them at [phone] or [phone].      Nosebleed, Adult  A nosebleed is when blood comes out of the nose. Nosebleeds are common. Usually, they are not a sign of a serious condition.    Nosebleeds can happen if a small blood vessel in your nose starts to bleed or if the lining of your nose (mucous membrane) cracks. They are commonly caused by:    Allergies.  Colds.  Picking your nose.  Blowing your nose too hard.  An injury from sticking an object into your nose or getting hit in the nose.  Dry or cold air.    Less common causes of nosebleeds include:    Toxic fumes.  Something abnormal in the nose or in the air-filled spaces in the bones of the face (sinuses).  Growths in the nose, such as polyps.  Medicines or conditions that cause blood to clot slowly.  Certain illnesses or procedures that irritate or dry out the nasal passages.    Follow these instructions at home:  When you have a nosebleed:     Sit down and tilt your head slightly forward.  Use a clean towel or tissue to pinch your nostrils under the bony part of your nose. After 10 minutes, let go of your nose and see if bleeding starts again. Do not release pressure before that time. If there is still bleeding, repeat the pinching and holding for 10 minutes until the bleeding stops.  Do not place tissues or gauze in the nose to stop bleeding.  Avoid lying down and avoid tilting your head backward. That may make blood collect in the throat and cause gagging or coughing.  Use a nasal spray decongestant to help with a nosebleed as told by your health care provider.  Image Do not use petroleum jelly or mineral oil in your nose. It can drip into your lungs.  After a nosebleed:     Avoid blowing your nose or sniffing for a number of hours.  Avoid straining, lifting, or bending at the waist for several days. You may resume other normal activities as you are able.  Use saline spray or a humidifier as told by your health care provider.  Aspirin and blood thinners make bleeding more likely. If you are prescribed these medicines and you suffer from nosebleeds:    Ask your health care provider if you should stop taking the medicines or if you should adjust the dose.  Do not stop taking medicines that your health care provider has recommended unless told by your health care provider.    If your nosebleed was caused by dry mucous membranes, use over-the-counter saline nasal spray or gel. This will keep the mucous membranes moist and allow them to heal. If you must use a lubricant:    Choose one that is water-soluble.  Use only as much as you need and use it only as often as needed.  Do not lie down until several hours after you use it.    Contact a health care provider if:  You have a fever.  You get nosebleeds often or more often than usual.  You bruise very easily.  You have a nosebleed from having something stuck in your nose.  You have bleeding in your mouth.  You vomit or cough up brown material.  You have a nosebleed after you start a new medicine.  Get help right away if:  You have a nosebleed after a fall or a head injury.  Your nosebleed does not go away after 20 minutes.  You feel dizzy or weak.  You have unusual bleeding from other parts of your body.  You have unusual bruising on other parts of your body.  You become sweaty.  You vomit blood.

## 2025-01-31 NOTE — ED PROCEDURE NOTE - CPROC ED NASAL DETAIL1
TXA/The source of the bleeding was clearly identified./Topical thrombin sprayed onto the affected area./The bleeding stopped.

## 2025-01-31 NOTE — ED PROVIDER NOTE - CLINICAL SUMMARY MEDICAL DECISION MAKING FREE TEXT BOX
64-year-old female presenting for evaluation of left nare spontaneous epistaxis.  Was evaluated earlier in ED, developed recurrence of bleed.  Atraumatic.  No other acute complaints.  No anticoagulation.  Well appearing, NAD, non toxic. NCAT PERRLA EOMI small active oozing from left nare.  Status post application of gauze soaked TXA and subsequent removal.  Patient declining to stay for observation post removal.  Comfortable with discharge and follow-up outpatient, strict return precautions given. Endorses understanding of all of this and aware that they can return at any time for new or concerning symptoms. No further questions or concerns at this time

## 2025-01-31 NOTE — ED PROVIDER NOTE - PATIENT PORTAL LINK FT
You can access the FollowMyHealth Patient Portal offered by [facility] by registering at the following website: [email or web address]. By joining [employer]’s FollowMyHealth portal, you will also be able to view your health information using other applications (apps) compatible with our system.

## 2025-01-31 NOTE — ED PROVIDER NOTE - NSFOLLOWUPINSTRUCTIONS_ED_ALL_ED_FT
Our Emergency Department Referral Coordinators will be reaching out to you in the next 24-48 hours from 9:00am to 5:00pm to schedule a follow up appointment. Please expect a phone call from the hospital in that time frame. If you do not receive a call or if you have any questions or concerns, you can reach them at   [phone].

## 2025-01-31 NOTE — ED PROVIDER NOTE - OBJECTIVE STATEMENT
64-year-old female presenting for nosebleed..  This is patient's third visit today, initially she did not have any active bleed her second visit she has local placement of TXA which temporarily resolved the bleeding however when she went home the bleeding began again. patient not on any anticoagulants.  Patient denies any lightheadedness/dizziness.

## 2025-01-31 NOTE — ED PROVIDER NOTE - DIFFERENTIAL DIAGNOSIS
Differential Diagnosis The differential diagnosis for patients clinical presentation includes but is not limited to: anterior vs posterior epistaxis

## 2025-01-31 NOTE — ED PROCEDURE NOTE - CPROC ED DIRECTIONAL
left Alert and oriented to person, place and time, memory intact, behavior appropriate to situation, PERRL.

## 2025-01-31 NOTE — ED PROVIDER NOTE - CLINICAL SUMMARY MEDICAL DECISION MAKING FREE TEXT BOX
65 yo F presented to ED for eval of nosebleed. Pt reports this is her third nosebleed today, was seen in ED with improvement after TXA, reports L nare started bleeding again once she went home. Not on A/C. Denies other sx.     CONSTITUTIONAL: NAD.   SKIN: warm, dry  HEAD: Normocephalic; atraumatic.  EYES: no conjunctival injection. EOMI.   ENT: MMM. blood in L nare, no blood in oropharynx.   NECK: Supple.  CARD: RRR.   EXT: Normal ROM.    Plan- nasal packing. Nasal packing placed. Patient's records (prior hospital, ED visit, and/or nursing home notes if available) were reviewed.  Additional history was obtained from EMS, family, and/or PCP (where available).  Escalation to admission/observation was considered. However patient feels much better and is comfortable with discharge.  Appropriate follow-up was arranged. Return precautions discussed in detail.

## 2025-01-31 NOTE — ED PROVIDER NOTE - CLINICAL SUMMARY MEDICAL DECISION MAKING FREE TEXT BOX
Patient VSS, hemostatic on my exam.  No source of bleeding noted.  Offered packing to ensure persistent hemostasis.  Prefers expectant management.  DC home.  F/U with ENT.  Strict return instructions discussed.

## 2025-01-31 NOTE — ED PROVIDER NOTE - PATIENT PORTAL LINK FT
You can access the FollowMyHealth Patient Portal offered by [facility] by registering at the following website: [email or web address]. By joining [employer]’s FollowMyHealth portal, you will also be able to view your health information using other applications (apps) compatible with our system.

## 2025-01-31 NOTE — ED PROVIDER NOTE - PHYSICAL EXAMINATION
VITAL SIGNS: I have reviewed nursing notes and confirm.  CONSTITUTIONAL: Well-developed; well-nourished; in no acute distress.  SKIN: Skin exam is warm and dry, no acute rash.  HEAD: Normocephalic  EYES: PERRL, EOM intact; conjunctiva and sclera clear.  ENT: minimal blood in left nare. No source identified  NECK: Supple  RESP: speaking in full sentences   EXT: Normal ROM.   NEURO: Alert, oriented. Grossly unremarkable. No focal deficits.   PSYCH: Cooperative, appropriate. VITAL SIGNS: I have reviewed nursing notes and confirm.  CONSTITUTIONAL: Well-developed; well-nourished; in no acute distress.  SKIN: Skin exam is warm and dry, no acute rash.  HEAD: Normocephalic  EYES: PERRL, EOM intact; conjunctiva and sclera clear.  ENT: minimal blood in left nare. No source identified. no blood seen in oral pharynx  NECK: Supple  RESP: speaking in full sentences   EXT: Normal ROM.   NEURO: Alert, oriented. Grossly unremarkable. No focal deficits.   PSYCH: Cooperative, appropriate.

## 2025-01-31 NOTE — ED ADULT TRIAGE NOTE - CHIEF COMPLAINT QUOTE
Pt c/o woke up with nose bleed this morning. Pt denies AC use. States hx of HTN on meds. Bleeding controlled in triage. States she has a hx of needing to be cauterized for nose bleed 3 (mild pain)

## 2025-01-31 NOTE — ED PROVIDER NOTE - OBJECTIVE STATEMENT
64-year-old female no significant past medical history presents today for a nosebleed.  Patient not on anticoagulation said that she woke up this morning without any trauma and had a bleed out of the left side of her nose for approximately 5 minutes that became controlled after pressure.  Patient come in for evaluation whether she needed cauterization or not.  Patient has never seen an ENT before.  Patient denies any blood disorders or trauma

## 2025-01-31 NOTE — ED PROVIDER NOTE - NSFOLLOWUPINSTRUCTIONS_ED_ALL_ED_FT
- NASAL PACKING MUST BE REMOVED FROM HEALTH CARE PROVIDER WITHIN 48 HOURS    -IF YOU ARE NOT ABLE TO SCHEDULE AN APPOINTMENT WITH ENT WITHIN 48 HOURS PLEASE REPORT TO ED FOR REMOVAL     Our Emergency Department Referral Coordinators will be reaching out to you in the next 24-48 hours from 9:00am to 5:00pm to schedule a follow up appointment. Please expect a phone call from the hospital in that time frame. If you do not receive a call or if you have any questions or concerns, you can reach them at   [phone].      Nosebleed    WHAT YOU NEED TO KNOW:    A nosebleed, or epistaxis, occurs when one or more of the blood vessels in your nose break. You may have dark or bright red blood from one or both nostrils. A nosebleed is most commonly caused by dry air or picking your nose. A direct blow to your nose, irritation from a cold or allergies, or a foreign object can also cause a nosebleed.     DISCHARGE INSTRUCTIONS:    Return to the emergency department if:     Your nasal packing is soaked with blood.      Your nose is still bleeding after 20 minutes, even after you pinch it.       You have a foul-smelling discharge coming out of your nose.      You feel so weak and dizzy that you have trouble standing up.      You have trouble breathing or talking.     Contact your healthcare provider if:     You have a fever and are vomiting.      You have pain in and around your nose that is getting worse even after you take pain medicines.      Your nasal pack is loose.      You have questions or concerns about your condition or care.    First aid:     Sit up and lean forward. This will help prevent you from swallowing blood. Spit blood and saliva into a bowl.       Apply pressure to your nose. Use 2 fingers to pinch your nose shut for 10 to 15 minutes. This will help stop the bleeding. Breathe through your mouth.            Apply ice on the bridge of your nose to decrease swelling and bleeding. Use a cold pack or put crushed ice in a plastic bag. Cover it with a towel to protect your skin.      Pack your nose with a cotton ball, tissue, tampon, or gauze bandage to stop the bleeding.    Medicines:     Medicines applied to a small piece of cotton and placed in your nose. Medicine may also be sprayed in or applied directly to your nose. You may need medicine to prevent an infection. If bleeding is severe, medicine may be injected into a blood vessel in your nose.       Take your medicine as directed. Contact your healthcare provider if you think your medicine is not helping or if you have side effects. Tell him of her if you are allergic to any medicine. Keep a list of the medicines, vitamins, and herbs you take. Include the amounts, and when and why you take them. Bring the list or the pill bottles to follow-up visits. Carry your medicine list with you in case of an emergency.    Prevent another nosebleed:     Keep your nose moist. Put a small amount of petroleum jelly inside your nostrils as needed. Use a saline (saltwater) nasal spray. Do not put anything else inside your nose unless your healthcare provider says it is okay. Do not use oil-based lubricants if you use oxygen therapy. They may be flammable.      Use a cool mist humidifier to increase air moisture in your home. This will help your nose stay moist.       Do not pick or blow your nose for at least a week. You can irritate or damage your nose if you pick it. Blowing your nose too hard may cause the bleeding to start again. Do not bend over or strain as this can cause the bleeding to start again.      Avoid irritants such as tobacco smoke or chemical sprays such as .    Follow up with your healthcare provider as directed: Any packing in your nose should be removed within 2 to 3 days. Write down your questions so you remember to ask them during your visits.        © Copyright [employer] 2019 All illustrations and images included in CareNotes are the copyrighted property of [employer] or [employer].

## 2025-01-31 NOTE — ED PROVIDER NOTE - OBJECTIVE STATEMENT
Pt is a 64 female with PMH noted in chart presents to ED with complaints of nose bleed. Pt states was here earlier this morning had nose bleed. Pt states stopped with pressure and DC. rebled this afternoon bringing her back to ED. Pt states has been bleeding from L nare for last 30 minutes. Pt denies any trauma and use of OAC. Denies dizziness, chest pain, sob, headache.

## 2025-01-31 NOTE — ED ADULT NURSE NOTE - CHIEF COMPLAINT QUOTE
electronic
Pt c/o woke up with nose bleed this morning. Pt denies AC use. States hx of HTN on meds. Bleeding controlled in triage. States she has a hx of needing to be cauterized for nose bleed

## 2025-01-31 NOTE — ED PROVIDER NOTE - NS ED ROS FT
Constitutional: (-) fever  Eyes/ENT: (-) blurry vision, (+) epistaxis  Cardiovascular: (-) chest pain, (-) syncope  Respiratory: (-) cough, (-) shortness of breath  Gastrointestinal: (-) vomiting, (-) diarrhea  Musculoskeletal: (-) neck pain, (-) back pain, (-) joint pain  Integumentary: (-) rash, (-) edema  Neurological: (-) headache, (-) altered mental status  Psychiatric: (-) hallucinations  Allergic/Immunologic: (-) pruritus

## 2025-02-02 ENCOUNTER — EMERGENCY (EMERGENCY)
Facility: HOSPITAL | Age: 65
LOS: 0 days | Discharge: HOME | End: 2025-02-02
Attending: STUDENT IN AN ORGANIZED HEALTH CARE EDUCATION/TRAINING PROGRAM
Payer: MEDICARE

## 2025-02-02 VITALS
TEMPERATURE: 98 F | HEIGHT: 64 IN | HEART RATE: 73 BPM | OXYGEN SATURATION: 99 % | RESPIRATION RATE: 18 BRPM | SYSTOLIC BLOOD PRESSURE: 139 MMHG | DIASTOLIC BLOOD PRESSURE: 85 MMHG | WEIGHT: 130.07 LBS

## 2025-02-02 DIAGNOSIS — R04.0 EPISTAXIS: ICD-10-CM

## 2025-02-02 PROCEDURE — 99281 EMR DPT VST MAYX REQ PHY/QHP: CPT

## 2025-02-02 PROCEDURE — 99283 EMERGENCY DEPT VISIT LOW MDM: CPT

## 2025-02-06 ENCOUNTER — APPOINTMENT (OUTPATIENT)
Dept: OTOLARYNGOLOGY | Facility: CLINIC | Age: 65
End: 2025-02-06
Payer: MEDICARE

## 2025-02-06 VITALS — SYSTOLIC BLOOD PRESSURE: 152 MMHG | HEART RATE: 85 BPM | DIASTOLIC BLOOD PRESSURE: 90 MMHG

## 2025-02-06 DIAGNOSIS — I10 ESSENTIAL (PRIMARY) HYPERTENSION: ICD-10-CM

## 2025-02-06 PROCEDURE — 31238 NSL/SINS NDSC SRG NSL HEMRRG: CPT | Mod: RT

## 2025-02-06 PROCEDURE — 99203 OFFICE O/P NEW LOW 30 MIN: CPT | Mod: 25

## 2025-02-06 RX ORDER — BACITRACIN 500 [IU]/G
500 OINTMENT TOPICAL
Qty: 28 | Refills: 4 | Status: ACTIVE | COMMUNITY
Start: 2025-02-06 | End: 1900-01-01

## 2025-02-07 ENCOUNTER — APPOINTMENT (OUTPATIENT)
Dept: OTOLARYNGOLOGY | Facility: CLINIC | Age: 65
End: 2025-02-07
Payer: MEDICARE

## 2025-02-07 VITALS — HEART RATE: 65 BPM | DIASTOLIC BLOOD PRESSURE: 74 MMHG | SYSTOLIC BLOOD PRESSURE: 121 MMHG

## 2025-02-07 DIAGNOSIS — R04.0 EPISTAXIS: ICD-10-CM

## 2025-02-07 PROCEDURE — 31231 NASAL ENDOSCOPY DX: CPT

## 2025-02-07 PROCEDURE — 99213 OFFICE O/P EST LOW 20 MIN: CPT | Mod: 25

## 2025-03-20 NOTE — ED PROVIDER NOTE - CROS ED HEME ALL NEG
Voicemail:Hi, my name is Luisa Castillo. I'm just returning a call about my appointment. April, if you guys could call me back [phone].    Returned patient's call and appt has been rescheduled for 4/28/25.  
negative...

## 2025-04-09 ENCOUNTER — OUTPATIENT (OUTPATIENT)
Dept: OUTPATIENT SERVICES | Facility: HOSPITAL | Age: 65
LOS: 1 days | End: 2025-04-09
Payer: MEDICARE

## 2025-04-09 DIAGNOSIS — Z00.8 ENCOUNTER FOR OTHER GENERAL EXAMINATION: ICD-10-CM

## 2025-04-09 DIAGNOSIS — R92.2 INCONCLUSIVE MAMMOGRAM: ICD-10-CM

## 2025-04-09 PROCEDURE — 76641 ULTRASOUND BREAST COMPLETE: CPT | Mod: 26,50

## 2025-04-09 PROCEDURE — 76641 ULTRASOUND BREAST COMPLETE: CPT | Mod: 50

## 2025-04-10 DIAGNOSIS — R92.2 INCONCLUSIVE MAMMOGRAM: ICD-10-CM
